# Patient Record
Sex: FEMALE | Race: WHITE | RURAL
[De-identification: names, ages, dates, MRNs, and addresses within clinical notes are randomized per-mention and may not be internally consistent; named-entity substitution may affect disease eponyms.]

---

## 2017-06-14 ENCOUNTER — OFFICE VISIT (OUTPATIENT)
Dept: FAMILY MEDICINE CLINIC | Age: 27
End: 2017-06-14

## 2017-06-14 VITALS
OXYGEN SATURATION: 98 % | DIASTOLIC BLOOD PRESSURE: 83 MMHG | RESPIRATION RATE: 20 BRPM | TEMPERATURE: 99.9 F | WEIGHT: 231.4 LBS | HEART RATE: 105 BPM | SYSTOLIC BLOOD PRESSURE: 134 MMHG

## 2017-06-14 DIAGNOSIS — J06.9 URI WITH COUGH AND CONGESTION: Primary | ICD-10-CM

## 2017-06-14 DIAGNOSIS — R50.9 FEVER, UNSPECIFIED FEVER CAUSE: ICD-10-CM

## 2017-06-14 DIAGNOSIS — R09.81 NASAL CONGESTION: ICD-10-CM

## 2017-06-14 DIAGNOSIS — R52 BODY ACHES: ICD-10-CM

## 2017-06-14 LAB
QUICKVUE INFLUENZA TEST: NEGATIVE
S PYO AG THROAT QL: NEGATIVE
VALID INTERNAL CONTROL?: YES
VALID INTERNAL CONTROL?: YES

## 2017-06-14 RX ORDER — AZITHROMYCIN 250 MG/1
TABLET, FILM COATED ORAL
Qty: 6 TAB | Refills: 0 | Status: SHIPPED | OUTPATIENT
Start: 2017-06-14

## 2017-06-14 NOTE — MR AVS SNAPSHOT
Visit Information Date & Time Provider Department Dept. Phone Encounter #  
 6/14/2017  3:30 PM Joe Chaves PA-C [address] [id number] Upcoming Health Maintenance Date Due DTaP/Tdap/Td series (1 - Tdap) 2/9/2010 PAP AKA CERVICAL CYTOLOGY 2/9/2010 INFLUENZA AGE 9 TO ADULT 8/1/2017 Allergies as of 6/14/2017  Review Complete On: 6/14/2017 By: Joe Chaves PA-C Severity Noted Reaction Type Reactions Penicillins  06/14/2017    Hives Current Immunizations  Never Reviewed No immunizations on file. Not reviewed this visit You Were Diagnosed With   
  
 Codes Comments URI with cough and congestion    -  Primary ICD-10-CM: J06.9 ICD-9-CM: 465.9 Nasal congestion     ICD-10-CM: R09.81 ICD-9-CM: 478.19 Body aches     ICD-10-CM: R52 ICD-9-CM: 780.96 Fever, unspecified fever cause     ICD-10-CM: R50.9 ICD-9-CM: 780.60 Vitals BP Pulse Temp Resp Weight(growth percentile) LMP  
 (!) 152/95 (BP 1 Location: Right arm, BP Patient Position: Sitting) (!) 119 99.9 °F (37.7 °C) (Oral) 20 231 lb 6.4 oz (105 kg) 04/14/2017 SpO2 Smoking Status 98% Former Smoker Preferred Pharmacy Pharmacy Name Phone [facility] [place] [address] [phone] Your Updated Medication List  
  
   
This list is accurate as of: 6/14/17  4:55 PM.  Always use your most recent med list.  
  
  
  
  
 azithromycin 250 mg tablet Commonly known as:  [place] Take 2 tablets today, then take 1 tablet daily ZyrTEC 10 mg Cap Generic drug:  Cetirizine Take  by mouth. Prescriptions Sent to Pharmacy Refills  
 azithromycin (ZITHROMAX) 250 mg tablet 0 Sig: Take 2 tablets today, then take 1 tablet daily Class: Normal  
 Pharmacy: [address] [facility], [address] [place] [address] Brad Hernadez Ph #: [phone] We Performed the Following AMB POC RAPID INFLUENZA TEST [49161 CPT(R)] AMB POC RAPID STREP A [96247 CPT(R)] Patient Instructions Upper Respiratory Infection (Cold): Care Instructions Your Care Instructions An upper respiratory infection, or URI, is an infection of the nose, sinuses, or throat. URIs are spread by coughs, sneezes, and direct contact. The common cold is the most frequent kind of URI. The flu and sinus infections are other kinds of URIs. Almost all URIs are caused by viruses. Antibiotics won't cure them. But you can treat most infections with home care. This may include drinking lots of fluids and taking over-the-counter pain medicine. You will probably feel better in 4 to 10 days. The doctor has checked you carefully, but problems can develop later. If you notice any problems or new symptoms, get medical treatment right away. Follow-up care is a key part of your treatment and safety. Be sure to make and go to all appointments, and call your doctor if you are having problems. It's also a good idea to know your test results and keep a list of the medicines you take. How can you care for yourself at home? · To prevent dehydration, drink plenty of fluids, enough so that your urine is light yellow or clear like water. Choose water and other caffeine-free clear liquids until you feel better. If you have kidney, heart, or liver disease and have to limit fluids, talk with your doctor before you increase the amount of fluids you drink. · Take an over-the-counter pain medicine, such as acetaminophen (Tylenol), ibuprofen (Advil, Motrin), or naproxen (Aleve). Read and follow all instructions on the label. · Before you use cough and cold medicines, check the label. These medicines may not be safe for young children or for people with certain health problems.  
· Be careful when taking over-the-counter cold or flu medicines and Tylenol at the same time. Many of these medicines have acetaminophen, which is Tylenol. Read the labels to make sure that you are not taking more than the recommended dose. Too much acetaminophen (Tylenol) can be harmful. · Get plenty of rest. 
· Do not smoke or allow others to smoke around you. If you need help quitting, talk to your doctor about stop-smoking programs and medicines. These can increase your chances of quitting for good. When should you call for help? Call 911 anytime you think you may need emergency care. For example, call if: 
· You have severe trouble breathing. Call your doctor now or seek immediate medical care if: 
· You seem to be getting much sicker. · You have new or worse trouble breathing. · You have a new or higher fever. · You have a new rash. Watch closely for changes in your health, and be sure to contact your doctor if: 
· You have a new symptom, such as a sore throat, an earache, or sinus pain. · You cough more deeply or more often, especially if you notice more mucus or a change in the color of your mucus. · You do not get better as expected. Where can you learn more? Go to http://aida-neela.info/. Enter W418 in the search box to learn more about \"Upper Respiratory Infection (Cold): Care Instructions. \" Current as of: June 30, 2016 Content Version: 11.2 © 3237-0612 [employer]. Care instructions adapted under license by [employer] (which disclaims liability or warranty for this information). If you have questions about a medical condition or this instruction, always ask your healthcare professional. [place] [address] any warranty or liability for your use of this information. Introducing [facility]! Chandana Fallon introduces [employer] patient portal. Now you can access parts of your medical record, email your doctor's office, and request medication refills online. 1. In your internet browser, go to [email or web address] 2. Click on the First Time User? Click Here link in the Sign In box. You will see the New Member Sign Up page. 3. Enter your [facility] Access Code exactly as it appears below. You will not need to use this code after youve completed the sign-up process. If you do not sign up before the expiration date, you must request a new code. · [facility] Access Code: [email or web address] Expires: 9/12/2017  3:42 PM 
 
4. Enter the last four digits of your Social Security Number (xxxx) and Date of Birth (mm/dd/yyyy) as indicated and click Submit. You will be taken to the next sign-up page. 5. Create a Clearleapt ID. This will be your [facility] login ID and cannot be changed, so think of one that is secure and easy to remember. 6. Create a [facility] password. You can change your password at any time. 7. Enter your Password Reset Question and Answer. This can be used at a later time if you forget your password. 8. Enter your e-mail address. You will receive e-mail notification when new information is available in [address] 9. Click Sign Up. You can now view and download portions of your medical record. 10. Click the Download Summary menu link to download a portable copy of your medical information. If you have questions, please visit the Frequently Asked Questions section of the [facility] website. Remember, [facility] is NOT to be used for urgent needs. For medical emergencies, dial 911. Now available from your iPhone and Android! Please provide this summary of care documentation to your next provider. If you have any questions after today's visit, please call [phone].

## 2017-06-14 NOTE — PROGRESS NOTES
Chief Complaint   Patient presents with    Sinus Infection     severe head congestion, cough, runny nose, overall not feeling well     Visit Vitals    BP (!) 152/95 (BP 1 Location: Right arm, BP Patient Position: Sitting)    Pulse (!) 119    Temp 99.9 °F (37.7 °C) (Oral)    Resp 20    Wt 231 lb 6.4 oz (105 kg)    LMP 04/14/2017  Comment: has ovarian cyst    SpO2 98%     Haris Arteaga LPN

## 2017-06-14 NOTE — PATIENT INSTRUCTIONS

## 2017-06-14 NOTE — PROGRESS NOTES
Rebecca Jackson is a 28 y.o. female who presents to the office today with the following:  Chief Complaint   Patient presents with    Sinus Infection     severe head congestion, cough, runny nose, overall not feeling well       HPI   Pt reports sxs began early yesterday. Started with cough, could not sleep. Bringing up yellow/green mucus. Sinus pain/congestion, chest congestion, chest and throat hurts when coughs (no other cp)  Blowing nose, stays full. Gradually worsening since yesterday. Unsure fevers at home. Body aches, but mostly back and feels more sore from work. Drinking plenty fluids per pt. Taking otc Zyrtec, nasal decongestant oral, cough drops. Kids are sick at home. Hx asthma. Has not required inhaler in 2+ years, only uses when sick. Does not currently feel sob or wheeze. Works for [employer]. Pt reports hx of elev BP. Has never been tx. Says she has been overweight and former smoker. Always elev at previous PCP  +Family hx. Also states resting HR often around . Review of Systems   Constitutional: Positive for chills, fever and malaise/fatigue. HENT: Positive for congestion. Negative for ear pain and sore throat (only when coughs, not consistent, no painful swallowing). Eyes: Negative. Respiratory: Positive for cough. Negative for shortness of breath and wheezing. Cardiovascular: Negative for chest pain. Gastrointestinal: Positive for diarrhea (slightly, but chronic intermittent since cholecystectomy). Negative for abdominal pain, nausea and vomiting. Genitourinary: Negative. Musculoskeletal: Negative for myalgias. Skin: Negative for rash. Neurological: Positive for headaches. See HPI. Allergies   Allergen Reactions    Penicillins Hives       Current Outpatient Prescriptions   Medication Sig    Cetirizine (ZYRTEC) 10 mg cap Take  by mouth.     azithromycin (ZITHROMAX) 250 mg tablet Take 2 tablets today, then take 1 tablet daily     No current facility-administered medications for this visit. Past Medical History:   Diagnosis Date    Asthma     GERD (gastroesophageal reflux disease)     Ovarian cyst     causes irregular menstruation       Past Surgical History:   Procedure Laterality Date    HX CHOLECYSTECTOMY      HX TONSIL AND ADENOIDECTOMY  1999       Social History     Social History    Marital status:      Spouse name: N/A    Number of children: N/A    Years of education: N/A     Social History Main Topics    Smoking status: Former Smoker     Quit date: 5/20/2017    Smokeless tobacco: None    Alcohol use No    Drug use: No    Sexual activity: Yes     Partners: Male     Birth control/ protection: None     Other Topics Concern    None     Social History Narrative    None       Family History   Problem Relation Age of Onset    Hypertension Mother    Sheila Spine Bladder Disease Mother     Hypertension Father     Gall Bladder Disease Father     Stroke Father     No Known Problems Brother          Physical Exam:  Visit Vitals    /83 (BP 1 Location: Left arm, BP Patient Position: Sitting)    Pulse (!) 105    Temp 99.9 °F (37.7 °C) (Oral)    Resp 20    Wt 231 lb 6.4 oz (105 kg)    LMP 04/14/2017  Comment: has ovarian cyst    SpO2 98%     Physical Exam   Constitutional: She is oriented to person, place, and time and well-developed, well-nourished, and in no distress. HENT:   Head: Normocephalic and atraumatic. Right Ear: Tympanic membrane, external ear and ear canal normal.   Left Ear: Tympanic membrane, external ear and ear canal normal.   Nose: Mucosal edema (narrow nares, trace clear mucus) present. Right sinus exhibits no maxillary sinus tenderness and no frontal sinus tenderness. Left sinus exhibits no maxillary sinus tenderness and no frontal sinus tenderness.    Mouth/Throat: Uvula is midline, oropharynx is clear and moist and mucous membranes are normal.   Eyes: Conjunctivae and EOM are normal.   Neck: Normal range of motion. Neck supple. Cardiovascular: Regular rhythm and normal heart sounds. Tachycardia present. Pulmonary/Chest: Effort normal. No respiratory distress. She has wheezes (one transient wheeze that resolved RUL, remaining sounds nl, pt actively coughing). She has no rales. Abdominal: Soft. There is no tenderness. Musculoskeletal: She exhibits no edema. Lymphadenopathy:     She has no cervical adenopathy. Neurological: She is alert and oriented to person, place, and time. Gait normal.   Skin: Skin is warm and dry. Psychiatric: Mood and affect normal.   Nursing note and vitals reviewed. Assessment/Plan:    ICD-10-CM ICD-9-CM    1. URI with cough and congestion J06.9 465.9 azithromycin (ZITHROMAX) 250 mg tablet   2. Nasal congestion R09.81 478.19    3. Body aches R52 780.96 AMB POC RAPID INFLUENZA TEST   4. Fever, unspecified fever cause R50.9 780.60 AMB POC RAPID STREP A     Results for orders placed or performed in visit on 06/14/17   AMB POC RAPID INFLUENZA TEST   Result Value Ref Range    VALID INTERNAL CONTROL POC Yes     QuickVue Influenza test Negative Negative   AMB POC RAPID STREP A   Result Value Ref Range    VALID INTERNAL CONTROL POC Yes     Group A Strep Ag Negative Negative     Explained still likely viral and may try conservative tx prior to initiating abx. Encourage rest & fluids. Humidifier, warm compresses, nasal spray, decongestants. Discussed otc medications for symptomatic relief. RTO if sxs persist/worsen or develops any additional sxs/concerns. Also rec pt return to est care with PCP and f/u on BP/HR. Continue to push fluids in meantime, VS significantly improved with po fluids today. Follow-up Disposition:  Return if symptoms worsen or fail to improve.     Ansley Garland PA-C